# Patient Record
Sex: MALE | URBAN - METROPOLITAN AREA
[De-identification: names, ages, dates, MRNs, and addresses within clinical notes are randomized per-mention and may not be internally consistent; named-entity substitution may affect disease eponyms.]

---

## 2023-08-01 ENCOUNTER — TELEPHONE ENCOUNTER (OUTPATIENT)
Dept: URBAN - METROPOLITAN AREA CLINIC 113 | Facility: CLINIC | Age: 58
End: 2023-08-01

## 2023-09-25 ENCOUNTER — OFFICE VISIT (OUTPATIENT)
Dept: URBAN - METROPOLITAN AREA CLINIC 113 | Facility: CLINIC | Age: 58
End: 2023-09-25

## 2024-02-23 ENCOUNTER — OV NP (OUTPATIENT)
Dept: URBAN - METROPOLITAN AREA CLINIC 113 | Facility: CLINIC | Age: 59
End: 2024-02-23
Payer: COMMERCIAL

## 2024-02-23 ENCOUNTER — LAB (OUTPATIENT)
Dept: URBAN - METROPOLITAN AREA CLINIC 113 | Facility: CLINIC | Age: 59
End: 2024-02-23

## 2024-02-23 VITALS
SYSTOLIC BLOOD PRESSURE: 139 MMHG | DIASTOLIC BLOOD PRESSURE: 97 MMHG | WEIGHT: 187 LBS | HEIGHT: 70 IN | RESPIRATION RATE: 16 BRPM | HEART RATE: 67 BPM | BODY MASS INDEX: 26.77 KG/M2 | TEMPERATURE: 97.5 F

## 2024-02-23 DIAGNOSIS — K86.1 IDIOPATHIC CHRONIC PANCREATITIS: ICD-10-CM

## 2024-02-23 DIAGNOSIS — K86.81 EXOCRINE PANCREATIC INSUFFICIENCY: ICD-10-CM

## 2024-02-23 DIAGNOSIS — Z12.11 COLON CANCER SCREENING: ICD-10-CM

## 2024-02-23 PROBLEM — 235953007: Status: ACTIVE | Noted: 2024-02-23

## 2024-02-23 PROCEDURE — 99204 OFFICE O/P NEW MOD 45 MIN: CPT | Performed by: INTERNAL MEDICINE

## 2024-02-23 RX ORDER — PANCRELIPASE 24000; 76000; 120000 [USP'U]/1; [USP'U]/1; [USP'U]/1
TAKE THREE CAPSULES BY MOUTH THREE TIMES A DAY BEFORE MEAL(S) AND TAKE ONE CAPSULE WITH A SNACK CAPSULE, DELAYED RELEASE PELLETS ORAL
Qty: 520 | Refills: 2 | OUTPATIENT
Start: 2024-02-23 | End: 2024-11-19

## 2024-02-23 NOTE — HPI-TODAY'S VISIT:
The patient is a very pleasant 58-year-old referred for gastrointestinal issues. Referring records were reviewed.  CT scan of the abdomen and pelvis performed December 29, 2023 with contrast for abdominal pain and nausea and vomiting revealed a right ureter kidney stone with no hydronephrosis, evidence for chronic pancreatitis with no evidence of active inflammation and a large left adrenal myolipoma which has been stable since 2020.  Laboratory testing from December 29, 2023 revealed a normal CBC except for low platelets at 127, CMP showing a glucose of 109 potassium 3.0 with normal LFTs.  Lipase 226. The patient states that he has been noted to have pancreatic problems going back many years but until December they have not bothered him.  In December he had about 1 week of epigastric pain and that has now resolved.  He only has modest occasional alcohol consumption.  Denies any significant chronic alcohol consumption.  There is no family history of chronic pancreatitis or pancreatic adenocarcinoma.  He has never had a colonoscopy.  He feels well at this time.

## 2024-02-23 NOTE — EXAM-PHYSICAL EXAM
He is alert and oriented to person place and situation no acute distress.  There is no scleral icterus.  Heart reveals a regular rate and rhythm with a 2/6 systolic murmur best heard at the right sternal border.  No gallops or rubs are noted.  Abdomen is soft nondistended nontender.  Impression

## 2024-03-21 ENCOUNTER — COLON (OUTPATIENT)
Dept: URBAN - METROPOLITAN AREA SURGERY CENTER 25 | Facility: SURGERY CENTER | Age: 59
End: 2024-03-21